# Patient Record
Sex: MALE | Race: WHITE | NOT HISPANIC OR LATINO | URBAN - METROPOLITAN AREA
[De-identification: names, ages, dates, MRNs, and addresses within clinical notes are randomized per-mention and may not be internally consistent; named-entity substitution may affect disease eponyms.]

---

## 2023-03-25 ENCOUNTER — EMERGENCY (EMERGENCY)
Facility: HOSPITAL | Age: 38
LOS: 1 days | Discharge: HOME | End: 2023-03-25
Attending: STUDENT IN AN ORGANIZED HEALTH CARE EDUCATION/TRAINING PROGRAM | Admitting: STUDENT IN AN ORGANIZED HEALTH CARE EDUCATION/TRAINING PROGRAM
Payer: COMMERCIAL

## 2023-03-25 VITALS
HEART RATE: 106 BPM | WEIGHT: 235.01 LBS | SYSTOLIC BLOOD PRESSURE: 119 MMHG | HEIGHT: 73 IN | DIASTOLIC BLOOD PRESSURE: 70 MMHG | OXYGEN SATURATION: 95 % | TEMPERATURE: 103 F | RESPIRATION RATE: 18 BRPM

## 2023-03-25 VITALS
DIASTOLIC BLOOD PRESSURE: 74 MMHG | SYSTOLIC BLOOD PRESSURE: 117 MMHG | HEART RATE: 82 BPM | RESPIRATION RATE: 18 BRPM | OXYGEN SATURATION: 98 % | TEMPERATURE: 99 F

## 2023-03-25 DIAGNOSIS — Z20.822 CONTACT WITH AND (SUSPECTED) EXPOSURE TO COVID-19: ICD-10-CM

## 2023-03-25 DIAGNOSIS — J06.9 ACUTE UPPER RESPIRATORY INFECTION, UNSPECIFIED: ICD-10-CM

## 2023-03-25 DIAGNOSIS — R51.9 HEADACHE, UNSPECIFIED: ICD-10-CM

## 2023-03-25 DIAGNOSIS — B97.89 OTHER VIRAL AGENTS AS THE CAUSE OF DISEASES CLASSIFIED ELSEWHERE: ICD-10-CM

## 2023-03-25 LAB — LACTATE SERPL-SCNC: 1.1 MMOL/L — SIGNIFICANT CHANGE UP (ref 0.5–2)

## 2023-03-25 PROCEDURE — 99284 EMERGENCY DEPT VISIT MOD MDM: CPT | Mod: 25

## 2023-03-25 PROCEDURE — 87040 BLOOD CULTURE FOR BACTERIA: CPT

## 2023-03-25 PROCEDURE — 83605 ASSAY OF LACTIC ACID: CPT

## 2023-03-25 PROCEDURE — 99285 EMERGENCY DEPT VISIT HI MDM: CPT

## 2023-03-25 PROCEDURE — 85025 COMPLETE CBC W/AUTO DIFF WBC: CPT

## 2023-03-25 PROCEDURE — 80053 COMPREHEN METABOLIC PANEL: CPT

## 2023-03-25 PROCEDURE — 71046 X-RAY EXAM CHEST 2 VIEWS: CPT

## 2023-03-25 PROCEDURE — 71046 X-RAY EXAM CHEST 2 VIEWS: CPT | Mod: 26

## 2023-03-25 PROCEDURE — 70450 CT HEAD/BRAIN W/O DYE: CPT | Mod: 26,MA

## 2023-03-25 PROCEDURE — 87635 SARS-COV-2 COVID-19 AMP PRB: CPT

## 2023-03-25 PROCEDURE — 70450 CT HEAD/BRAIN W/O DYE: CPT | Mod: MA

## 2023-03-25 PROCEDURE — 81001 URINALYSIS AUTO W/SCOPE: CPT

## 2023-03-25 PROCEDURE — 36415 COLL VENOUS BLD VENIPUNCTURE: CPT

## 2023-03-25 NOTE — ED ADULT NURSE REASSESSMENT NOTE - NS ED NURSE REASSESS COMMENT FT1
Patient aox3, anxious, c/o of headache, fever, mild SOB, cough and body aches.  NSR on cardiac monitor, elevated temperature 103o, other vitals stable.  Right AC PIV #20 in place, all labs, sepsis work up done, blood cultures sent.  NSS 1 L bolus, Ofirmev 1 gm IVPB adminsitered VORB Dr. Jefferson.  Xray pending.  Isolation observed.  Will continue to monitor.

## 2023-03-25 NOTE — ED PROVIDER NOTE - OBJECTIVE STATEMENT
37-year-old male with no significant past medical history presenting with 3 days of flulike symptoms.  Patient endorsing headache, myalgias, cough, mild shortness of breath.  Patient's PCP prescribed him cefdinir which she began taking.  Denies any chest pain, no lightheadedness or dizziness, no neck pain or neck stiffness.  No blurry vision, no nausea or vomiting, no numbness or weakness or tingling.  No leg swelling.  ROS as above.

## 2023-03-25 NOTE — ED PROVIDER NOTE - NSFOLLOWUPINSTRUCTIONS_ED_ALL_ED_FT
Please take antibiotics as prescribed. Please stay well hydrated. Return for any worsening symptoms, fever, chills, shortness of breath. You should follow up with your primary physician on Tuesday as scheduled.     I hope you feel better soon!    Sincerely,  Julianne Madrigal MD

## 2023-03-25 NOTE — ED PROVIDER NOTE - PROGRESS NOTE DETAILS
Labs unremarkable, CT Head neg, CXR with possible developing infiltrate, discussed with patient's PCP, will see him on Tuesday. Advised him to continue abx prescribed to him by his PCP. Patient otherwise stable for discharge.

## 2023-03-25 NOTE — ED PROVIDER NOTE - CARE PROVIDER_API CALL
Nithin Skinner   INTERNAL MEDICINE  [address] [address]  Phone: [phone]  Fax: [phone]  Follow Up Time: 1-3 Days

## 2023-03-25 NOTE — ED ADULT NURSE NOTE - OBJECTIVE STATEMENT
Patient w/ no significant PMHx, c/o of cough, headache and fever w/ body aches and chills X 4 days, with nausea/no vomitting, with diarrhea X 4, no chest pain, no SOB.  States had Covid booster and Flu vaccine.  Temperature yesterday 103o, last took Tylenol PO 6 am today.

## 2023-03-25 NOTE — ED PROVIDER NOTE - PHYSICAL EXAMINATION
General: Well appearing, in no acute distress  HEENT: Normocephalic, atraumatic, extraocular movements intact  CV: Regular rate  Pulm: No respiratory distress, no tachypnea, CTAB, no w/r/r  Abd: Flat, no gross distension  Ext: warm and well perfused  Skin: No gross rashes or lesions  Neuro: Alert and oriented, moving all extremities, no nuchal rigidity, motor and sensation intact

## 2023-03-25 NOTE — ED ADULT NURSE REASSESSMENT NOTE - NS ED NURSE REASSESS COMMENT FT1
All labs, Xray resulted, NSS 1 L bolus, Ofirmev IVPB completed, patient states feeling better, no headache, fever resolved, fluids PO tolerated well.  Vital signs stable. Discharged to home in stable condition.

## 2023-03-25 NOTE — ED PROVIDER NOTE - PATIENT PORTAL LINK FT
You can access the FollowMyHealth Patient Portal offered by [facility] by registering at the following website: [email or web address]. By joining [employer]’s FollowMyHealth portal, you will also be able to view your health information using other applications (apps) compatible with our system.

## 2023-03-25 NOTE — ED PROVIDER NOTE - NS ED ROS FT
Constitutional: + f/c  Eyes: No discharge or drainage  Ears, Nose, Mouth, Throat: No nasal discharge, no sore throat  Cardiovascular: No chest pain, no palpitations  Respiratory: + shortness of breath, + cough  Gastrointestinal: No nausea or vomiting, no abdominal pain, no diarrhea or constipation  Musculoskeletal: No joint pain, no swelling  Skin: No rashes or lesions  Neurological: No numbness, weakness, tingling, + headache  Psychiatric: No depression

## 2023-03-25 NOTE — ED PROVIDER NOTE - CLINICAL SUMMARY MEDICAL DECISION MAKING FREE TEXT BOX
37-year-old male with no significant past medical history presenting with 2 to 3 days of URI symptoms, fever, cough, shortness of breath, headache.  Patient well-appearing, mild hypoxia 94% on room air but no respiratory distress, lungs clear to auscultation bilaterally.  Exam otherwise nonfocal.  Also with headache but no nuchal rigidity, no concern for meningitis on exam.  Sent by Dr. Skinner for CT head. Will check labs, CXR, COVID/FLU, IV hydration, CT Head, reassess.

## 2023-03-29 ENCOUNTER — INPATIENT (INPATIENT)
Facility: HOSPITAL | Age: 38
LOS: 1 days | Discharge: HOME | DRG: 178 | End: 2023-03-31
Payer: COMMERCIAL

## 2023-03-29 VITALS
WEIGHT: 235.01 LBS | RESPIRATION RATE: 20 BRPM | DIASTOLIC BLOOD PRESSURE: 71 MMHG | SYSTOLIC BLOOD PRESSURE: 128 MMHG | TEMPERATURE: 99 F | HEIGHT: 74 IN | HEART RATE: 104 BPM | OXYGEN SATURATION: 93 %

## 2023-03-29 DIAGNOSIS — Z59.9 PROBLEM RELATED TO HOUSING AND ECONOMIC CIRCUMSTANCES, UNSPECIFIED: ICD-10-CM

## 2023-03-29 DIAGNOSIS — E87.1 HYPO-OSMOLALITY AND HYPONATREMIA: ICD-10-CM

## 2023-03-29 DIAGNOSIS — Z29.9 ENCOUNTER FOR PROPHYLACTIC MEASURES, UNSPECIFIED: ICD-10-CM

## 2023-03-29 DIAGNOSIS — J18.9 PNEUMONIA, UNSPECIFIED ORGANISM: ICD-10-CM

## 2023-03-29 LAB
MAGNESIUM SERPL-MCNC: 2 MG/DL — SIGNIFICANT CHANGE UP (ref 1.6–2.6)
PHOSPHATE SERPL-MCNC: 3.1 MG/DL — SIGNIFICANT CHANGE UP (ref 2.5–4.5)
S PNEUM AG UR QL: NEGATIVE — SIGNIFICANT CHANGE UP

## 2023-03-29 PROCEDURE — 99284 EMERGENCY DEPT VISIT MOD MDM: CPT

## 2023-03-29 PROCEDURE — 99232 SBSQ HOSP IP/OBS MODERATE 35: CPT | Mod: GC

## 2023-03-29 PROCEDURE — 71046 X-RAY EXAM CHEST 2 VIEWS: CPT | Mod: 26

## 2023-03-29 RX ORDER — IPRATROPIUM/ALBUTEROL SULFATE 18-103MCG
3 AEROSOL WITH ADAPTER (GRAM) INHALATION EVERY 6 HOURS
Refills: 0 | Status: DISCONTINUED | OUTPATIENT
Start: 2023-03-29 | End: 2023-03-31

## 2023-03-29 RX ORDER — ACETAMINOPHEN 500 MG
650 TABLET ORAL EVERY 6 HOURS
Refills: 0 | Status: DISCONTINUED | OUTPATIENT
Start: 2023-03-29 | End: 2023-03-31

## 2023-03-29 RX ORDER — AZITHROMYCIN 500 MG/1
500 TABLET, FILM COATED ORAL EVERY 24 HOURS
Refills: 0 | Status: COMPLETED | OUTPATIENT
Start: 2023-03-30 | End: 2023-03-31

## 2023-03-29 RX ORDER — CEFTRIAXONE 500 MG/1
1000 INJECTION, POWDER, FOR SOLUTION INTRAMUSCULAR; INTRAVENOUS EVERY 24 HOURS
Refills: 0 | Status: DISCONTINUED | OUTPATIENT
Start: 2023-03-30 | End: 2023-03-30

## 2023-03-29 RX ORDER — ACETAMINOPHEN 500 MG
1000 TABLET ORAL ONCE
Refills: 0 | Status: COMPLETED | OUTPATIENT
Start: 2023-03-29 | End: 2023-03-29

## 2023-03-29 RX ORDER — LANOLIN ALCOHOL/MO/W.PET/CERES
3 CREAM (GRAM) TOPICAL AT BEDTIME
Refills: 0 | Status: DISCONTINUED | OUTPATIENT
Start: 2023-03-29 | End: 2023-03-31

## 2023-03-29 RX ADMIN — Medication 650 MILLIGRAM(S): at 23:15

## 2023-03-29 RX ADMIN — Medication 400 MILLIGRAM(S): at 18:47

## 2023-03-29 RX ADMIN — Medication 650 MILLIGRAM(S): at 22:21

## 2023-03-29 RX ADMIN — Medication 3 MILLILITER(S): at 17:19

## 2023-03-29 RX ADMIN — Medication 3 MILLIGRAM(S): at 22:21

## 2023-03-29 RX ADMIN — Medication 1000 MILLIGRAM(S): at 21:01

## 2023-03-29 SDOH — ECONOMIC STABILITY - INCOME SECURITY: PROBLEM RELATED TO HOUSING AND ECONOMIC CIRCUMSTANCES, UNSPECIFIED: Z59.9

## 2023-03-29 NOTE — H&P ADULT - NSHPLABSRESULTS_GEN_ALL_CORE
LABS:                        13.6   6.91  )-----------( 293      ( 29 Mar 2023 09:23 )             39.9     03-29    132<L>  |  92<L>  |  13  ----------------------------<  120<H>  3.8   |  29  |  1.25    Ca    9.2      29 Mar 2023 09:23    TPro  7.7  /  Alb  4.0  /  TBili  0.4  /  DBili  x   /  AST  32  /  ALT  36  /  AlkPhos  48  03-29        CAPILLARY BLOOD GLUCOSE                  Urinalysis Basic - ( 29 Mar 2023 10:56 )    Color: Yellow / Appearance: Clear / SG: <=1.005 / pH: x  Gluc: x / Ketone: NEGATIVE  / Bili: Negative / Urobili: 0.2 E.U./dL   Blood: x / Protein: NEGATIVE mg/dL / Nitrite: NEGATIVE   Leuk Esterase: NEGATIVE / RBC: x / WBC x   Sq Epi: x / Non Sq Epi: x / Bacteria: x                I & O Summary:      Microbiology:        RADIOLOGY, EKG AND ADDITIONAL TESTS: Reviewed.

## 2023-03-29 NOTE — H&P ADULT - PROBLEM SELECTOR PLAN 3
F: PO  E: Replete PRN  N: Regular diet   GI ppx: None  DVT ppx: None  Code status: Full code  Dispo: MARLY

## 2023-03-29 NOTE — H&P ADULT - NSHPPHYSICALEXAM_GEN_ALL_CORE
T(C): 37.1 (03-29-23 @ 13:31), Max: 37.1 (03-29-23 @ 13:31)  HR: 85 (03-29-23 @ 13:31) (85 - 85)  BP: 112/63 (03-29-23 @ 13:31) (112/63 - 112/63)  RR: 18 (03-29-23 @ 13:31) (18 - 18)  SpO2: 97% (03-29-23 @ 13:31) (97% - 97%)    GENERAL: Younger male, diaphoretic, uncomfortable-appearing  HEENT: NC/AT, PERRLA, EOMI, conjunctiva and sclera clear, MMM, OP clear   RESP: CTAB, no rales, ronchi, or wheezing  CV: RRR, +S1S2, no murmurs, rubs, or gallops   ABD: Soft NTND, no rebound or guarding, no palpable masses   EXT: WWP, 2+ peripheral pulses, no clubbing, cyanosis, or edema  NEURO: A&Ox3, no focal neurologic deficits   PSYCH: Normal mood and affect

## 2023-03-29 NOTE — H&P ADULT - HISTORY OF PRESENT ILLNESS
Patient is 37 year old with no significant past medical history presenting for fever, cough, sweats, headache for 1 week. Seen at [facility] last week with negative CT head and CXR and normal labs. Was discharged with Augmentin. Saw PCP Dr. Skinner, had repeat CT head and CT chest, Reported pneumonia on CT chest. Was also started on steroids and atrovent inhaler. Wife says patient has been "delirious;" when asked to elaborate, patient says he is occasionally "forgetful." Dr. Skinner and Dr. Beth conversed and recommend the patient go to the hospital for possible admission and further workup. Pt took Tylenol 1g po prior to arrival.    ED course:  Vitals: /71, , RR 20, T 99.4 F, O2 93%  Labs: ESR 84, CBC wnl, lactate 1.0, Na 132, Cl 92  U/A negative  CXR: consolidation right lower lobe  ECG: NSR @99 bpm, right axis deviation, QTc 462, no ST-T changes  Interventions: LR 1L, ceftriaxone 1 g, azithromycin 500 mg, Tylenol 650 mg po x1, ibuprofen 400 mg po x1 KENNY GRAF [id number] is a 36 yo Male with no significant PMH who presents to the ED with fever, cough, sweats, headache, and mild SOB x1 week. Pt developed these symptoms 1 week ago, for which he saw his PCP Dr. Skinner who prescribed him cefdinir which he began taking. He was seen at [facility] ED on 3/25 when these symptoms did not improve on cefdinir. At that time, labs were unremarkable, CT head was negative, and CXR was clear. He was discharged from the ED on Augmentin. He saw his PCP again, where he had a repeat CT head and CT chest performed. CT chest showed RLL pneumonia and pt was also started on PO prednisone and Atrovent inhaler. Wife reports that since pt has been taking prednisone, he has been "delirious". When asked to elaborate, pt says that he has been occasionally "forgetful". Pt also reports that he has been having very high fevers between 103-105 F, which has required Tylenol every 6 hours. Dr. Skinner and Dr. Beth conversed and recommended patient return to the hospital for possible admission and further workup. On arrival, pt continued to report fever, chills, headache, lightheadedness, mild SOB, dry cough, decreased PO intake, diarrhea (2 liquid stools/day), fatigue, and myalgias. He denies chest pain, palpitations, nausea/vomiting, constipation, urinary frequency/urgency, dysuria, vision changes, lower extremity swelling, numbness, or tingling. Also denies recent travel, recent antibiotic use, or sick contacts.    ED vitals: T 99.4 F, , /71, RR 20, O2 93% on RA  ED labs: ESR 84, CBC wnl, lactate 1.0, Na 132, CL 92, UA negative, RVP/Covid negative   ED studies: CXR- Right lower lobe consolidation. EKG- NSR, right axis deviation, QTc 462, no ST-T changes   ED Interventions: 1L LR, CTX 1g IV x1, azithromycin 500 mg IV x1, tylenol 650 mg PO x1 (also took tylenol 1g PO prior to ED arrival), ibuprofen 400 mg PO x1

## 2023-03-29 NOTE — H&P ADULT - PROBLEM SELECTOR PLAN 1
Presented with fever, cough, sweats, headche, and mild SOB x1 week. CXR showing RLL consolidation. 1/4 SIRS criteria on admission. WBC negative. s/p CTX 1g IV x1 and azithromycin 500 mg IV x1 in ED   - c/w CTX 1g IV daily & azithromycin 500 mg IV daily   - c/w Tylenol 650 mg PO q6h PRN for fever   - Duo nebs PRN  - f/u urine strep/legionella   - Hold off on additional steroids given delirium/confusion on PO prednisone Presented with fever, cough, sweats, headche, and mild SOB x1 week. CXR showing RLL consolidation. 1/4 SIRS criteria on admission. WBC negative. s/p CTX 1g IV x1 and azithromycin 500 mg IV x1 in ED   - c/w CTX 1g IV daily & azithromycin 500 mg IV daily   - c/w Tylenol 650 mg PO q6h PRN for fever   - Duo nebs PRN  - f/u urine strep/legionella   - f/u BCx, UCx   - Hold off on additional steroids given delirium/confusion on PO prednisone Presented with fever, cough, sweats, headche, and mild SOB x1 week. CXR showing RLL consolidation. 1/4 SIRS criteria on admission. WBC negative. s/p CTX 1g IV x1 and azithromycin 500 mg IV x1 in ED   - c/w CTX 1g IV daily & azithromycin 500 mg IV daily   - c/w Tylenol 650 mg PO q6h PRN for fever   - Duo nebs PRN  - f/u urine strep/legionella   - f/u BCx, UCx   - Monitor CBC, vitals  - Hold off on additional steroids given delirium/confusion on PO prednisone Presented with fever, cough, sweats, headche, and mild SOB x1 week. CXR showing RLL consolidation. 1/4 SIRS criteria on admission. WBC negative. HIV negative on outpatient labs. s/p CTX 1g IV x1 and azithromycin 500 mg IV x1 in ED   - c/w CTX 1g IV daily & azithromycin 500 mg IV daily   - c/w Tylenol 650 mg PO q6h PRN for fever   - Duo nebs PRN  - f/u urine strep/legionella   - f/u BCx, UCx   - Monitor CBC, vitals  - Hold off on additional steroids given delirium/confusion on PO prednisone

## 2023-03-29 NOTE — H&P ADULT - NSHPSOCIALHISTORY_GEN_ALL_CORE
Lives with wife who works as . Goes to gym often. Denies any significant tobacco, alcohol, or recreational drug use.

## 2023-03-29 NOTE — H&P ADULT - ASSESSMENT
37 M w/ no significant PMH who presents for fever, cough, sweats, headache, and mild SOB x1 week, admitted for further treatment and workup of RLL PNA  37 M w/ no significant PMH who presents for fever, cough, sweats, headache, and mild SOB x1 week, admitted for RLL PNA

## 2023-03-29 NOTE — H&P ADULT - TIME BILLING
Patient seen and examined with house-staff during bedside rounds.  Resident note read, including vitals, physical findings, laboratory data, and radiological reports.   Revisions included below.  Direct personal management at bed side and extensive interpretation of the data.  Plan was outlined and discussed in details with the housestaff.  Decision making of high complexity  Action taken for acute disease activity to reflect the level of care provided:  - medication reconciliation  - review laboratory data  The patient was treated for CAP with Cefdinir but h did not improve and still spiking fever.  He had CT scan and labs which was reviewed.  CXR still consistent of PNA.  No evidence of pleural effusion neither on CXR nor CT.  No recent travel.  No tick bites.  Follow on cultures and serology.  Started IV abt.  Observe. Discussed with family

## 2023-03-30 LAB
ALBUMIN SERPL ELPH-MCNC: 3.3 G/DL — SIGNIFICANT CHANGE UP (ref 3.3–5)
ALP SERPL-CCNC: 44 U/L — SIGNIFICANT CHANGE UP (ref 40–120)
ALT FLD-CCNC: 38 U/L — SIGNIFICANT CHANGE UP (ref 10–45)
ANION GAP SERPL CALC-SCNC: 13 MMOL/L — SIGNIFICANT CHANGE UP (ref 5–17)
AST SERPL-CCNC: 35 U/L — SIGNIFICANT CHANGE UP (ref 10–40)
BASOPHILS # BLD AUTO: 0.03 K/UL — SIGNIFICANT CHANGE UP (ref 0–0.2)
BASOPHILS NFR BLD AUTO: 0.4 % — SIGNIFICANT CHANGE UP (ref 0–2)
BILIRUB SERPL-MCNC: 0.4 MG/DL — SIGNIFICANT CHANGE UP (ref 0.2–1.2)
BUN SERPL-MCNC: 9 MG/DL — SIGNIFICANT CHANGE UP (ref 7–23)
CALCIUM SERPL-MCNC: 8.6 MG/DL — SIGNIFICANT CHANGE UP (ref 8.4–10.5)
CHLORIDE SERPL-SCNC: 91 MMOL/L — LOW (ref 96–108)
CO2 SERPL-SCNC: 27 MMOL/L — SIGNIFICANT CHANGE UP (ref 22–31)
CREAT ?TM UR-MCNC: 80 MG/DL — SIGNIFICANT CHANGE UP
CREAT SERPL-MCNC: 1.13 MG/DL — SIGNIFICANT CHANGE UP (ref 0.5–1.3)
CRP SERPL-MCNC: 260.7 MG/L — HIGH (ref 0–4)
EGFR: 86 ML/MIN/1.73M2 — SIGNIFICANT CHANGE UP
EOSINOPHIL # BLD AUTO: 0 K/UL — SIGNIFICANT CHANGE UP (ref 0–0.5)
EOSINOPHIL NFR BLD AUTO: 0 % — SIGNIFICANT CHANGE UP (ref 0–6)
ERYTHROCYTE [SEDIMENTATION RATE] IN BLOOD: 85 MM/HR — HIGH
FERRITIN SERPL-MCNC: 1764 NG/ML — HIGH (ref 30–400)
GLUCOSE SERPL-MCNC: 116 MG/DL — HIGH (ref 70–99)
HCT VFR BLD CALC: 35.9 % — LOW (ref 39–50)
HGB BLD-MCNC: 12.1 G/DL — LOW (ref 13–17)
IMM GRANULOCYTES NFR BLD AUTO: 0.6 % — SIGNIFICANT CHANGE UP (ref 0–0.9)
LEGIONELLA AG UR QL: NEGATIVE — SIGNIFICANT CHANGE UP
LYMPHOCYTES # BLD AUTO: 1.5 K/UL — SIGNIFICANT CHANGE UP (ref 1–3.3)
LYMPHOCYTES # BLD AUTO: 21.6 % — SIGNIFICANT CHANGE UP (ref 13–44)
MAGNESIUM SERPL-MCNC: 2.2 MG/DL — SIGNIFICANT CHANGE UP (ref 1.6–2.6)
MCHC RBC-ENTMCNC: 29.7 PG — SIGNIFICANT CHANGE UP (ref 27–34)
MCHC RBC-ENTMCNC: 33.7 GM/DL — SIGNIFICANT CHANGE UP (ref 32–36)
MCV RBC AUTO: 88.2 FL — SIGNIFICANT CHANGE UP (ref 80–100)
MONOCYTES # BLD AUTO: 0.57 K/UL — SIGNIFICANT CHANGE UP (ref 0–0.9)
MONOCYTES NFR BLD AUTO: 8.2 % — SIGNIFICANT CHANGE UP (ref 2–14)
NEUTROPHILS # BLD AUTO: 4.79 K/UL — SIGNIFICANT CHANGE UP (ref 1.8–7.4)
NEUTROPHILS NFR BLD AUTO: 69.2 % — SIGNIFICANT CHANGE UP (ref 43–77)
NRBC # BLD: 0 /100 WBCS — SIGNIFICANT CHANGE UP (ref 0–0)
OSMOLALITY UR: 189 MOSM/KG — LOW (ref 300–900)
PHOSPHATE SERPL-MCNC: 3 MG/DL — SIGNIFICANT CHANGE UP (ref 2.5–4.5)
PLATELET # BLD AUTO: 297 K/UL — SIGNIFICANT CHANGE UP (ref 150–400)
POTASSIUM SERPL-MCNC: 3.4 MMOL/L — LOW (ref 3.5–5.3)
POTASSIUM SERPL-SCNC: 3.4 MMOL/L — LOW (ref 3.5–5.3)
PROCALCITONIN SERPL-MCNC: 0.22 NG/ML — HIGH (ref 0.02–0.1)
PROT SERPL-MCNC: 6.5 G/DL — SIGNIFICANT CHANGE UP (ref 6–8.3)
RBC # BLD: 4.07 M/UL — LOW (ref 4.2–5.8)
RBC # FLD: 13.5 % — SIGNIFICANT CHANGE UP (ref 10.3–14.5)
SODIUM SERPL-SCNC: 131 MMOL/L — LOW (ref 135–145)
SODIUM UR-SCNC: <20 MMOL/L — SIGNIFICANT CHANGE UP
TESTOST FREE+TOTAL PANEL SERPL-MCNC: 61.8 NG/DL — LOW (ref 300–836)
UUN UR-MCNC: 293 MG/DL — SIGNIFICANT CHANGE UP
WBC # BLD: 6.93 K/UL — SIGNIFICANT CHANGE UP (ref 3.8–10.5)
WBC # FLD AUTO: 6.93 K/UL — SIGNIFICANT CHANGE UP (ref 3.8–10.5)

## 2023-03-30 PROCEDURE — 99222 1ST HOSP IP/OBS MODERATE 55: CPT

## 2023-03-30 PROCEDURE — 99223 1ST HOSP IP/OBS HIGH 75: CPT | Mod: GC

## 2023-03-30 RX ORDER — SODIUM CHLORIDE 9 MG/ML
3 INJECTION INTRAMUSCULAR; INTRAVENOUS; SUBCUTANEOUS ONCE
Refills: 0 | Status: COMPLETED | OUTPATIENT
Start: 2023-03-30 | End: 2023-03-30

## 2023-03-30 RX ORDER — IPRATROPIUM/ALBUTEROL SULFATE 18-103MCG
3 AEROSOL WITH ADAPTER (GRAM) INHALATION ONCE
Refills: 0 | Status: COMPLETED | OUTPATIENT
Start: 2023-03-30 | End: 2023-03-30

## 2023-03-30 RX ORDER — CEFTRIAXONE 500 MG/1
2000 INJECTION, POWDER, FOR SOLUTION INTRAMUSCULAR; INTRAVENOUS EVERY 24 HOURS
Refills: 0 | Status: DISCONTINUED | OUTPATIENT
Start: 2023-03-30 | End: 2023-03-30

## 2023-03-30 RX ORDER — CEFTRIAXONE 500 MG/1
1000 INJECTION, POWDER, FOR SOLUTION INTRAMUSCULAR; INTRAVENOUS ONCE
Refills: 0 | Status: COMPLETED | OUTPATIENT
Start: 2023-03-30 | End: 2023-03-30

## 2023-03-30 RX ORDER — SODIUM CHLORIDE 9 MG/ML
4 INJECTION INTRAMUSCULAR; INTRAVENOUS; SUBCUTANEOUS EVERY 12 HOURS
Refills: 0 | Status: DISCONTINUED | OUTPATIENT
Start: 2023-03-30 | End: 2023-03-31

## 2023-03-30 RX ORDER — CEFTRIAXONE 500 MG/1
2000 INJECTION, POWDER, FOR SOLUTION INTRAMUSCULAR; INTRAVENOUS EVERY 24 HOURS
Refills: 0 | Status: DISCONTINUED | OUTPATIENT
Start: 2023-03-31 | End: 2023-03-31

## 2023-03-30 RX ADMIN — Medication 3 MILLIGRAM(S): at 21:47

## 2023-03-30 RX ADMIN — Medication 3 MILLILITER(S): at 06:17

## 2023-03-30 RX ADMIN — AZITHROMYCIN 500 MILLIGRAM(S): 500 TABLET, FILM COATED ORAL at 08:56

## 2023-03-30 RX ADMIN — CEFTRIAXONE 100 MILLIGRAM(S): 500 INJECTION, POWDER, FOR SOLUTION INTRAMUSCULAR; INTRAVENOUS at 16:22

## 2023-03-30 RX ADMIN — Medication 650 MILLIGRAM(S): at 12:47

## 2023-03-30 RX ADMIN — SODIUM CHLORIDE 3 MILLILITER(S): 9 INJECTION INTRAMUSCULAR; INTRAVENOUS; SUBCUTANEOUS at 14:37

## 2023-03-30 RX ADMIN — Medication 3 MILLILITER(S): at 14:37

## 2023-03-30 RX ADMIN — Medication 650 MILLIGRAM(S): at 19:27

## 2023-03-30 RX ADMIN — Medication 650 MILLIGRAM(S): at 18:53

## 2023-03-30 RX ADMIN — Medication 650 MILLIGRAM(S): at 12:17

## 2023-03-30 RX ADMIN — Medication 650 MILLIGRAM(S): at 07:00

## 2023-03-30 RX ADMIN — CEFTRIAXONE 100 MILLIGRAM(S): 500 INJECTION, POWDER, FOR SOLUTION INTRAMUSCULAR; INTRAVENOUS at 08:56

## 2023-03-30 RX ADMIN — Medication 30 MILLILITER(S): at 14:51

## 2023-03-30 RX ADMIN — Medication 650 MILLIGRAM(S): at 06:13

## 2023-03-30 NOTE — CONSULT NOTE ADULT - ATTENDING COMMENTS
Agree with above. Patient has community acquired pneumonia.  Most likely etiology is Strep pneumoniae. I am also concerned for legionaire's disease given diarrhea, hyponatremia and non-response to beta-lactams.  Send urine legionella antigen and legionella sputum culture.  Continue Azithromycin 500 mg PO daily.  Can continue Ceftriaxone 2 grams IV daily.  Send sputum culture (induced)

## 2023-03-30 NOTE — PROGRESS NOTE ADULT - PROBLEM SELECTOR PLAN 1
Presented with fever, cough, sweats, headche, and mild SOB x1 week. CXR showing RLL consolidation. 1/4 SIRS criteria on admission. WBC negative. HIV negative on outpatient labs. s/p CTX 1g IV x1 and azithromycin 500 mg IV x1 in ED   - Increase CTX to 2g IV daily   - azithromycin 500 mg IV daily   - c/w Tylenol 650 mg PO q6h PRN for fever   - Duo nebs PRN with hypertonic saline.   - urine strep and legionella negative.   - cultures negative.   - ID recs appreciated  - will induce sputum culture.

## 2023-03-30 NOTE — PROGRESS NOTE ADULT - PROBLEM SELECTOR PLAN 2
Na 132 on admission. Na 137 during previous ED visit on 3/29. Likely hypovolemic hyponatremia i/s/o decreased PO intake   - urine studies suggest hypovolemic hyponatremia.   - Encourage PO intake   - Monitor BMP

## 2023-03-30 NOTE — CONSULT NOTE ADULT - SUBJECTIVE AND OBJECTIVE BOX
INFECTIOUS DISEASES INITIAL CONSULT NOTE    HPI:  KENNY GRAF [id number] is a 38 yo Male with no significant PMH who presents to the ED with fever, cough, sweats, headache, and mild SOB x1 week. Pt developed these symptoms 1 week ago, for which he saw his PCP Dr. Skinner who prescribed him cefdinir which he began taking. He was seen at [facility] ED on 3/25 when these symptoms did not improve on cefdinir. At that time, labs were unremarkable, CT head was negative, and CXR was clear. He was discharged from the ED on Augmentin. He saw his PCP again, where he had a repeat CT head and CT chest performed. CT chest showed RLL pneumonia and pt was also started on PO prednisone and Atrovent inhaler. Wife reports that since pt has been taking prednisone, he has been "delirious". When asked to elaborate, pt says that he has been occasionally "forgetful". Pt also reports that he has been having very high fevers between 103-105 F, which has required Tylenol every 6 hours. Dr. Skinner and Dr. Beth conversed and recommended patient return to the hospital for possible admission and further workup. On arrival, pt continued to report fever, chills, headache, lightheadedness, mild SOB, dry cough, decreased PO intake, diarrhea (2 liquid stools/day), fatigue, and myalgias. He denies chest pain, palpitations, nausea/vomiting, constipation, urinary frequency/urgency, dysuria, vision changes, lower extremity swelling, numbness, or tingling. Also denies recent travel, recent antibiotic use, or sick contacts.    ED vitals: T 99.4 F, , /71, RR 20, O2 93% on RA  ED labs: ESR 84, CBC wnl, lactate 1.0, Na 132, CL 92, UA negative, RVP/Covid negative   ED studies: CXR- Right lower lobe consolidation. EKG- NSR, right axis deviation, QTc 462, no ST-T changes   ED Interventions: 1L LR, CTX 1g IV x1, azithromycin 500 mg IV x1, tylenol 650 mg PO x1 (also took tylenol 1g PO prior to ED arrival), ibuprofen 400 mg PO x1 (29 Mar 2023 13:31)    Patient reports ongoing diarrhea x1 week. Also notes a previous hx of COVID x3, most recently >18 months ago. Patient denies recent sick contacts, travel within last 3 months, animal exposure ((other than dachshund - family pet).       PAST MEDICAL & SURGICAL HISTORY:  No pertinent past medical history      No significant past surgical history            Review of Systems:   Constitutional, eyes, ENT, cardiovascular, respiratory, gastrointestinal, genitourinary, integumentary, neurological, psychiatric and heme/lymph are otherwise negative other than noted above       ANTIBIOTICS:  MEDICATIONS  (STANDING):  albuterol/ipratropium for Nebulization 3 milliLiter(s) Nebulizer once  azithromycin   Tablet 500 milliGRAM(s) Oral every 24 hours  cefTRIAXone   IVPB 1000 milliGRAM(s) IV Intermittent every 24 hours  sodium chloride 0.9% for Nebulization 3 milliLiter(s) Nebulizer once    MEDICATIONS  (PRN):  acetaminophen     Tablet .. 650 milliGRAM(s) Oral every 6 hours PRN Temp greater or equal to 38C (100.4F), Mild Pain (1 - 3)  albuterol/ipratropium for Nebulization 3 milliLiter(s) Nebulizer every 6 hours PRN Shortness of Breath and/or Wheezing  bismuth subsalicylate Liquid 30 milliLiter(s) Oral every 4 hours PRN Dyspepsia  melatonin 3 milliGRAM(s) Oral at bedtime PRN Sleep      Allergies    No Known Allergies    Intolerances        SOCIAL HISTORY:    FAMILY HISTORY:   no FH leading to current infection    Vital Signs Last 24 Hrs  T(C): 37.3 (30 Mar 2023 11:27), Max: 39.1 (29 Mar 2023 18:33)  T(F): 99.2 (30 Mar 2023 11:27), Max: 102.4 (29 Mar 2023 18:33)  HR: 90 (30 Mar 2023 11:27) (87 - 98)  BP: 122/72 (30 Mar 2023 11:27) (122/72 - 146/75)  BP(mean): --  RR: 18 (30 Mar 2023 11:27) (16 - 19)  SpO2: 96% (30 Mar 2023 11:27) (93% - 97%)    Parameters below as of 30 Mar 2023 11:27  Patient On (Oxygen Delivery Method): room air          PHYSICAL EXAM:  Constitutional: alert, NAD  Eyes: the sclera and conjunctiva were normal.   ENT: the ears and nose were normal in appearance.   Neck: the appearance of the neck was normal and the neck was supple.   Pulmonary: no respiratory distress and faintly appreciable crackles in LLL (paraspinal area).   Heart: heart rate was normal and rhythm regular, normal S1 and S2  Vascular:. there was no peripheral edema  Abdomen: normal bowel sounds, soft, non-tender  Neurological: no focal deficits.   Psychiatric: the affect was normal      LABS:                        12.1   6.93  )-----------( 297      ( 30 Mar 2023 05:30 )             35.9     03-30    131<L>  |  91<L>  |  9   ----------------------------<  116<H>  3.4<L>   |  27  |  1.13    Ca    8.6      30 Mar 2023 05:30  Phos  3.0     03-30  Mg     2.2     03-30    TPro  6.5  /  Alb  3.3  /  TBili  0.4  /  DBili  x   /  AST  35  /  ALT  38  /  AlkPhos  44  03-30      Urinalysis Basic - ( 29 Mar 2023 10:56 )    Color: Yellow / Appearance: Clear / SG: <=1.005 / pH: x  Gluc: x / Ketone: NEGATIVE  / Bili: Negative / Urobili: 0.2 E.U./dL   Blood: x / Protein: NEGATIVE mg/dL / Nitrite: NEGATIVE   Leuk Esterase: NEGATIVE / RBC: x / WBC x   Sq Epi: x / Non Sq Epi: x / Bacteria: x        MICROBIOLOGY:    RADIOLOGY & ADDITIONAL STUDIES:

## 2023-03-31 VITALS
DIASTOLIC BLOOD PRESSURE: 79 MMHG | SYSTOLIC BLOOD PRESSURE: 123 MMHG | OXYGEN SATURATION: 94 % | HEART RATE: 77 BPM | RESPIRATION RATE: 16 BRPM | TEMPERATURE: 98 F

## 2023-03-31 LAB
ALBUMIN SERPL ELPH-MCNC: 3.5 G/DL — SIGNIFICANT CHANGE UP (ref 3.3–5)
ALP SERPL-CCNC: 50 U/L — SIGNIFICANT CHANGE UP (ref 40–120)
ALT FLD-CCNC: 65 U/L — HIGH (ref 10–45)
ANION GAP SERPL CALC-SCNC: 9 MMOL/L — SIGNIFICANT CHANGE UP (ref 5–17)
AST SERPL-CCNC: 54 U/L — HIGH (ref 10–40)
BASOPHILS # BLD AUTO: 0.04 K/UL — SIGNIFICANT CHANGE UP (ref 0–0.2)
BASOPHILS NFR BLD AUTO: 0.7 % — SIGNIFICANT CHANGE UP (ref 0–2)
BILIRUB SERPL-MCNC: 0.3 MG/DL — SIGNIFICANT CHANGE UP (ref 0.2–1.2)
BUN SERPL-MCNC: 8 MG/DL — SIGNIFICANT CHANGE UP (ref 7–23)
CALCIUM SERPL-MCNC: 9.4 MG/DL — SIGNIFICANT CHANGE UP (ref 8.4–10.5)
CHLORIDE SERPL-SCNC: 92 MMOL/L — LOW (ref 96–108)
CO2 SERPL-SCNC: 31 MMOL/L — SIGNIFICANT CHANGE UP (ref 22–31)
CREAT SERPL-MCNC: 1.05 MG/DL — SIGNIFICANT CHANGE UP (ref 0.5–1.3)
EGFR: 94 ML/MIN/1.73M2 — SIGNIFICANT CHANGE UP
EOSINOPHIL # BLD AUTO: 0.12 K/UL — SIGNIFICANT CHANGE UP (ref 0–0.5)
EOSINOPHIL NFR BLD AUTO: 2 % — SIGNIFICANT CHANGE UP (ref 0–6)
GLUCOSE SERPL-MCNC: 118 MG/DL — HIGH (ref 70–99)
HCT VFR BLD CALC: 39.1 % — SIGNIFICANT CHANGE UP (ref 39–50)
HGB BLD-MCNC: 13 G/DL — SIGNIFICANT CHANGE UP (ref 13–17)
IMM GRANULOCYTES NFR BLD AUTO: 0.7 % — SIGNIFICANT CHANGE UP (ref 0–0.9)
LYMPHOCYTES # BLD AUTO: 1.92 K/UL — SIGNIFICANT CHANGE UP (ref 1–3.3)
LYMPHOCYTES # BLD AUTO: 31.3 % — SIGNIFICANT CHANGE UP (ref 13–44)
MAGNESIUM SERPL-MCNC: 2.8 MG/DL — HIGH (ref 1.6–2.6)
MCHC RBC-ENTMCNC: 30 PG — SIGNIFICANT CHANGE UP (ref 27–34)
MCHC RBC-ENTMCNC: 33.2 GM/DL — SIGNIFICANT CHANGE UP (ref 32–36)
MCV RBC AUTO: 90.3 FL — SIGNIFICANT CHANGE UP (ref 80–100)
MONOCYTES # BLD AUTO: 0.69 K/UL — SIGNIFICANT CHANGE UP (ref 0–0.9)
MONOCYTES NFR BLD AUTO: 11.2 % — SIGNIFICANT CHANGE UP (ref 2–14)
NEUTROPHILS # BLD AUTO: 3.33 K/UL — SIGNIFICANT CHANGE UP (ref 1.8–7.4)
NEUTROPHILS NFR BLD AUTO: 54.1 % — SIGNIFICANT CHANGE UP (ref 43–77)
NRBC # BLD: 0 /100 WBCS — SIGNIFICANT CHANGE UP (ref 0–0)
PHOSPHATE SERPL-MCNC: 4.3 MG/DL — SIGNIFICANT CHANGE UP (ref 2.5–4.5)
PLATELET # BLD AUTO: 295 K/UL — SIGNIFICANT CHANGE UP (ref 150–400)
POTASSIUM SERPL-MCNC: 3.9 MMOL/L — SIGNIFICANT CHANGE UP (ref 3.5–5.3)
POTASSIUM SERPL-SCNC: 3.9 MMOL/L — SIGNIFICANT CHANGE UP (ref 3.5–5.3)
PROT SERPL-MCNC: 7 G/DL — SIGNIFICANT CHANGE UP (ref 6–8.3)
RBC # BLD: 4.33 M/UL — SIGNIFICANT CHANGE UP (ref 4.2–5.8)
RBC # FLD: 13.4 % — SIGNIFICANT CHANGE UP (ref 10.3–14.5)
SODIUM SERPL-SCNC: 132 MMOL/L — LOW (ref 135–145)
WBC # BLD: 6.14 K/UL — SIGNIFICANT CHANGE UP (ref 3.8–10.5)
WBC # FLD AUTO: 6.14 K/UL — SIGNIFICANT CHANGE UP (ref 3.8–10.5)

## 2023-03-31 PROCEDURE — 87086 URINE CULTURE/COLONY COUNT: CPT

## 2023-03-31 PROCEDURE — 82728 ASSAY OF FERRITIN: CPT

## 2023-03-31 PROCEDURE — 83935 ASSAY OF URINE OSMOLALITY: CPT

## 2023-03-31 PROCEDURE — 84540 ASSAY OF URINE/UREA-N: CPT

## 2023-03-31 PROCEDURE — 85027 COMPLETE CBC AUTOMATED: CPT

## 2023-03-31 PROCEDURE — 83735 ASSAY OF MAGNESIUM: CPT

## 2023-03-31 PROCEDURE — 81003 URINALYSIS AUTO W/O SCOPE: CPT

## 2023-03-31 PROCEDURE — 80053 COMPREHEN METABOLIC PANEL: CPT

## 2023-03-31 PROCEDURE — 36415 COLL VENOUS BLD VENIPUNCTURE: CPT

## 2023-03-31 PROCEDURE — 84403 ASSAY OF TOTAL TESTOSTERONE: CPT

## 2023-03-31 PROCEDURE — 87451 POLYVALENT MULT ORG EA AG IA: CPT

## 2023-03-31 PROCEDURE — 82570 ASSAY OF URINE CREATININE: CPT

## 2023-03-31 PROCEDURE — 96374 THER/PROPH/DIAG INJ IV PUSH: CPT

## 2023-03-31 PROCEDURE — 99285 EMERGENCY DEPT VISIT HI MDM: CPT | Mod: 25

## 2023-03-31 PROCEDURE — 83605 ASSAY OF LACTIC ACID: CPT

## 2023-03-31 PROCEDURE — 85025 COMPLETE CBC W/AUTO DIFF WBC: CPT

## 2023-03-31 PROCEDURE — 84300 ASSAY OF URINE SODIUM: CPT

## 2023-03-31 PROCEDURE — 87449 NOS EACH ORGANISM AG IA: CPT

## 2023-03-31 PROCEDURE — 87040 BLOOD CULTURE FOR BACTERIA: CPT

## 2023-03-31 PROCEDURE — 85652 RBC SED RATE AUTOMATED: CPT

## 2023-03-31 PROCEDURE — 84100 ASSAY OF PHOSPHORUS: CPT

## 2023-03-31 PROCEDURE — 96375 TX/PRO/DX INJ NEW DRUG ADDON: CPT

## 2023-03-31 PROCEDURE — 99239 HOSP IP/OBS DSCHRG MGMT >30: CPT | Mod: GC

## 2023-03-31 PROCEDURE — 86140 C-REACTIVE PROTEIN: CPT

## 2023-03-31 PROCEDURE — 71046 X-RAY EXAM CHEST 2 VIEWS: CPT

## 2023-03-31 PROCEDURE — 84145 PROCALCITONIN (PCT): CPT

## 2023-03-31 PROCEDURE — 99232 SBSQ HOSP IP/OBS MODERATE 35: CPT

## 2023-03-31 PROCEDURE — 87899 AGENT NOS ASSAY W/OPTIC: CPT

## 2023-03-31 PROCEDURE — 94640 AIRWAY INHALATION TREATMENT: CPT

## 2023-03-31 PROCEDURE — 0225U NFCT DS DNA&RNA 21 SARSCOV2: CPT

## 2023-03-31 RX ORDER — AZITHROMYCIN 500 MG/1
500 TABLET, FILM COATED ORAL EVERY 24 HOURS
Refills: 0 | Status: DISCONTINUED | OUTPATIENT
Start: 2023-04-01 | End: 2023-03-31

## 2023-03-31 RX ORDER — AZITHROMYCIN 500 MG/1
1 TABLET, FILM COATED ORAL
Qty: 4 | Refills: 0
Start: 2023-03-31 | End: 2023-04-03

## 2023-03-31 RX ORDER — CEFPODOXIME PROXETIL 100 MG
1 TABLET ORAL
Qty: 10 | Refills: 0
Start: 2023-03-31 | End: 2023-04-04

## 2023-03-31 RX ADMIN — Medication 650 MILLIGRAM(S): at 03:19

## 2023-03-31 RX ADMIN — Medication 650 MILLIGRAM(S): at 04:00

## 2023-03-31 RX ADMIN — Medication 650 MILLIGRAM(S): at 13:05

## 2023-03-31 RX ADMIN — AZITHROMYCIN 500 MILLIGRAM(S): 500 TABLET, FILM COATED ORAL at 09:30

## 2023-03-31 RX ADMIN — Medication 650 MILLIGRAM(S): at 12:35

## 2023-03-31 RX ADMIN — Medication 100 MILLIGRAM(S): at 12:35

## 2023-03-31 RX ADMIN — CEFTRIAXONE 100 MILLIGRAM(S): 500 INJECTION, POWDER, FOR SOLUTION INTRAMUSCULAR; INTRAVENOUS at 13:36

## 2023-03-31 RX ADMIN — Medication 3 MILLILITER(S): at 09:42

## 2023-03-31 NOTE — DISCHARGE NOTE PROVIDER - NSDCCAREPROVSEEN_GEN_ALL_CORE_FT
Cristy Beth Cristy Beth A  Patient seen and examined with house-staff during bedside rounds.  Resident note read, including vitals, physical findings, laboratory data, and radiological reports.   Revisions included below.  Direct personal management at bed side and extensive interpretation of the data.  Plan was outlined and discussed in details with the housestaff.  Decision making of high complexity  Action taken for acute disease activity to reflect the level of care provided:  - medication reconciliation  - review laboratory data  The patient clinically improving.  The temperature curve improved.  The Legionella PCR is still pending.  The sodium improved.  Patient indicated that he has diarrhea.  The patient improved and will be discharged home.  Follow ID instruction.  Discussed the case with the family and also his primary MD who will follow-up as an outpatient next week

## 2023-03-31 NOTE — DISCHARGE NOTE PROVIDER - NSDCCPCAREPLAN_GEN_ALL_CORE_FT
PRINCIPAL DISCHARGE DIAGNOSIS  Diagnosis: Legionella pneumonia  Assessment and Plan of Treatment: Legionnaires' disease is a form of pneumonia — lung inflammation usually caused by infection. It's caused by a bacterium known as legionella.  Most people catch Legionnaires' disease by inhaling the bacteria from water or soil. Older adults, smokers and people with weakened immune systems are particularly susceptible to Legionnaires' disease.  Although prompt treatment with antibiotics usually cures Legionnaires' disease, some people continue to have problems after treatment.  Although your urine was negative for legionella pneumonia, your clinical symptoms and labs suggest legionella pneumonia. You are being discharged with oral antibiotics. You should follow-up with your primary care doctor in 1-2 weeks after discharge.

## 2023-03-31 NOTE — PROGRESS NOTE ADULT - TIME BILLING
Patient seen and examined with house-staff during bedside rounds.  Resident note read, including vitals, physical findings, laboratory data, and radiological reports.   Revisions included below.  Direct personal management at bed side and extensive interpretation of the data.  Plan was outlined and discussed in details with the housestaff.  Decision making of high complexity  Action taken for acute disease activity to reflect the level of care provided:  - medication reconciliation  - review laboratory data  Proving.  The temperature curve is down.  All cultures serology negative today.  Follow-up on the Legionella.  ID consult.  The oxygen saturation is stable.  Fluid restriction.
treatment of CAP

## 2023-03-31 NOTE — DISCHARGE NOTE NURSING/CASE MANAGEMENT/SOCIAL WORK - PATIENT PORTAL LINK FT
You can access the FollowMyHealth Patient Portal offered by [facility] by registering at the following website: [email or web address]. By joining [employer]’s FollowMyHealth portal, you will also be able to view your health information using other applications (apps) compatible with our system.

## 2023-03-31 NOTE — DISCHARGE NOTE NURSING/CASE MANAGEMENT/SOCIAL WORK - NSDCPEFALRISK_GEN_ALL_CORE
For information on Fall & Injury Prevention, visit: [email or web address] OR  [email or web address] OR  https://www.cdc.gov/steadi/patient.html

## 2023-03-31 NOTE — DISCHARGE NOTE PROVIDER - CARE PROVIDER_API CALL
Nithin Skinner   INTERNAL MEDICINE  [address] [address]  Phone: [phone]  Fax: [phone]  Follow Up Time:

## 2023-03-31 NOTE — PROGRESS NOTE ADULT - SUBJECTIVE AND OBJECTIVE BOX
INFECTIOUS DISEASES CONSULT FOLLOW-UP NOTE    INTERVAL HPI/OVERNIGHT EVENTS: shon      ROS:   Constitutional, eyes, ENT, cardiovascular, respiratory, gastrointestinal, genitourinary, integumentary, neurological, psychiatric and heme/lymph are otherwise negative other than noted above       ANTIBIOTICS/RELEVANT:    MEDICATIONS  (STANDING):  cefTRIAXone   IVPB 2000 milliGRAM(s) IV Intermittent every 24 hours  sodium chloride 3%  Inhalation 4 milliLiter(s) Inhalation every 12 hours    MEDICATIONS  (PRN):  acetaminophen     Tablet .. 650 milliGRAM(s) Oral every 6 hours PRN Temp greater or equal to 38C (100.4F), Mild Pain (1 - 3)  albuterol/ipratropium for Nebulization 3 milliLiter(s) Nebulizer every 6 hours PRN Shortness of Breath and/or Wheezing  bismuth subsalicylate Liquid 30 milliLiter(s) Oral every 4 hours PRN Dyspepsia  melatonin 3 milliGRAM(s) Oral at bedtime PRN Sleep        Vital Signs Last 24 Hrs  T(C): 36.7 (31 Mar 2023 06:28), Max: 37.2 (30 Mar 2023 20:37)  T(F): 98.1 (31 Mar 2023 06:28), Max: 99 (30 Mar 2023 20:37)  HR: 62 (31 Mar 2023 06:28) (62 - 82)  BP: 110/79 (31 Mar 2023 06:28) (110/79 - 117/78)  BP(mean): --  RR: 18 (31 Mar 2023 06:28) (18 - 18)  SpO2: 96% (31 Mar 2023 06:28) (95% - 96%)    Parameters below as of 31 Mar 2023 06:28  Patient On (Oxygen Delivery Method): room air        PHYSICAL EXAM:  Constitutional: alert, NAD  Eyes: the sclera and conjunctiva were normal.   ENT: the ears and nose were normal in appearance.   Neck: the appearance of the neck was normal and the neck was supple.   Pulmonary: no respiratory distress and faintly appreciable crackles in LLL (paraspinal area).   Heart: heart rate was normal and rhythm regular, normal S1 and S2  Vascular:. there was no peripheral edema  Abdomen: normal bowel sounds, soft, non-tender  Neurological: no focal deficits.   Psychiatric: the affect was normal        LABS:                        13.0   6.14  )-----------( 295      ( 31 Mar 2023 06:26 )             39.1     03-31    132<L>  |  92<L>  |  8   ----------------------------<  118<H>  3.9   |  31  |  1.05    Ca    9.4      31 Mar 2023 06:26  Phos  4.3     03-31  Mg     2.8     03-31    TPro  7.0  /  Alb  3.5  /  TBili  0.3  /  DBili  x   /  AST  54<H>  /  ALT  65<H>  /  AlkPhos  50  03-31          MICROBIOLOGY:      RADIOLOGY & ADDITIONAL STUDIES:  Reviewed
KENNY GRAF 37y Male  MRN#: [id number]   CODE STATUS: FULL      SUBJECTIVE  Patient is a 37y old Male who presents with a chief complaint of Pneumonia (30 Mar 2023 14:29)  Currently admitted to medicine with the primary diagnosis of pneumonia    Today is hospital day 1d, and this morning he is resting in bed comfortably and has no overnight events.     Present Today:           Sahu Catheter (X)No/ ()Yes? Indication:          Central Line (X)No/ ()Yes? Indication:          IV Fluids (X)No/ ()Yes? Type:  Rate:  Indication:      OBJECTIVE  PAST MEDICAL & SURGICAL HISTORY  No pertinent past medical history    No significant past surgical history      Home Meds:    ALLERGIES:  No Known Allergies    MEDICATIONS:  STANDING MEDICATIONS  azithromycin   Tablet 500 milliGRAM(s) Oral every 24 hours  sodium chloride 3%  Inhalation 4 milliLiter(s) Inhalation every 12 hours    PRN MEDICATIONS  acetaminophen     Tablet .. 650 milliGRAM(s) Oral every 6 hours PRN  albuterol/ipratropium for Nebulization 3 milliLiter(s) Nebulizer every 6 hours PRN  bismuth subsalicylate Liquid 30 milliLiter(s) Oral every 4 hours PRN  melatonin 3 milliGRAM(s) Oral at bedtime PRN      VITAL SIGNS: Last 24 Hours  T(C): 37.3 (30 Mar 2023 11:27), Max: 39.1 (29 Mar 2023 18:33)  T(F): 99.2 (30 Mar 2023 11:27), Max: 102.4 (29 Mar 2023 18:33)  HR: 90 (30 Mar 2023 11:27) (87 - 98)  BP: 122/72 (30 Mar 2023 11:27) (122/72 - 146/75)  BP(mean): --  RR: 18 (30 Mar 2023 11:27) (16 - 19)  SpO2: 96% (30 Mar 2023 11:27) (93% - 97%)    LABS:                        12.1   6.93  )-----------( 297      ( 30 Mar 2023 05:30 )             35.9     03-30    131<L>  |  91<L>  |  9   ----------------------------<  116<H>  3.4<L>   |  27  |  1.13    Ca    8.6      30 Mar 2023 05:30  Phos  3.0     03-30  Mg     2.2     03-30    TPro  6.5  /  Alb  3.3  /  TBili  0.4  /  DBili  x   /  AST  35  /  ALT  38  /  AlkPhos  44  03-30      Urinalysis Basic - ( 29 Mar 2023 10:56 )    Color: Yellow / Appearance: Clear / SG: <=1.005 / pH: x  Gluc: x / Ketone: NEGATIVE  / Bili: Negative / Urobili: 0.2 E.U./dL   Blood: x / Protein: NEGATIVE mg/dL / Nitrite: NEGATIVE   Leuk Esterase: NEGATIVE / RBC: x / WBC x   Sq Epi: x / Non Sq Epi: x / Bacteria: x        Sedimentation Rate, Erythrocyte: 85 mm/Hr *H* (03-30-23 @ 05:30)      Culture - Urine (collected 29 Mar 2023 10:56)  Source: .Urine None  Final Report (30 Mar 2023 08:50):    No growth    Culture - Blood (collected 29 Mar 2023 09:25)  Source: .Blood None  Preliminary Report (30 Mar 2023 11:00):    No growth at 1 day.    Culture - Blood (collected 29 Mar 2023 09:23)  Source: .Blood None  Preliminary Report (30 Mar 2023 11:00):    No growth at 1 day.          RADIOLOGY:      PHYSICAL EXAM:  General: NAD, AAOx3  HEENT: atraumatic, normocephalic  Pulmonary: clear to auscultation bilaterally; No wheeze  Cardiovascular: Regular rate and rhythm; no murmurs, rubs or gallops. Normal S1S2 decreased breath sounds in R. lower lung. no appreciable crackles  Gastrointestinal: Soft, nontender, nondistended; bowel sounds present  Musculoskeletal: 2+ peripheral pulses, no clubbing, cyanosis or edema  Neurology: Pt. alert and oriented, fluent speech, able to move all extremities  Skin: no rashes or lesions

## 2023-03-31 NOTE — PROGRESS NOTE ADULT - ATTENDING COMMENTS
Agree with above. He's clearly improved on current therapy. Urine legionella antigen is negative.  However, this only has a sensitivity of 70-80 % and only evaluates the most common serotype.  Index of suspicion is high given diarrhea, hyponatremia, elevated CRP and nonresponse to betalactams.  Recommend 7 day total course of azithromycin (he received 3rd dose today).  Strep is still a possibility as urine antigen is around 70 % sensitive.  Recommend completing a 7 day course of 3rd generation cephalosporins (ends 4/5/23).  Ok to discharge from my standpoint.

## 2023-03-31 NOTE — PROGRESS NOTE ADULT - ASSESSMENT
37M with no PMH who presents to the ED with fever, cough, sweats, headache, and mild SOB x1 week. Reports developing symptoms the week of 3/19, was evaluated by pMD ( Dr. Skinner) and began treatment for CAP with cefdinir. After 1 week of continuing worsening of symptoms patient presented to [facility], witched to augmentin. Upon f/u with pMD one more patient received Atrovent & prednisone (to tx severe PNA?), again without resolution of symptoms. On presentation to [facility] patient found with RLL consolidation on CXR, elevated CRP, negative strep Ag. Given relative health, age, and severity of symptoms with GI complaints (diarrhea), and hyponatremia, highly suspect legionella pneumonia. Although strep Ag negative, test sensitivity insufficient to r/o strep pneumoniae. HIV test as o/p negative. Please do not set end dates for ABx at this time given the likelihood for prolonged ABx treatment (expecting 7-10d treatment course with azithromycin). Legionella Uag negative however only 80% sensitivity and only tests the three most common serotypes. Given high clinical suspicion (CRP >100, GI symptoms, hyponatremia) will complete tx for legionella.   - stop CTX 2g IV q24h   - start cefpodoxime 200mg PO q12h through 4/5 (to complete a 7d course)  - stop azithromycin 500mg IV q24h   - start azithromycin 500mg PO q24h through 4/4 (to complete a 7d course i/s/o legionella suspicion)   - patient should follow with pMD for f/u legionella sputum Cx (requires growth on specialized agar), sputum legionella PCR, & sputum Cx    ID team 1 to sign off
37 M w/ no significant PMH who presents for fever, cough, sweats, headache, and mild SOB x1 week, admitted for RLL PNA

## 2023-03-31 NOTE — DISCHARGE NOTE PROVIDER - HOSPITAL COURSE
37 M w/ no significant PMH who presents for fever, cough, sweats, headache, and mild SOB x1 week, admitted for RLL PNA likely legionella.       Problem/Plan - 1: Community acquired pneumonia.   Presented with fever, cough, sweats, headache, and mild SOB x1 week. CXR showing RLL consolidation. 1/4 SIRS criteria on admission. WBC negative. HIV negative on outpatient labs. s/p CTX 1g IV x1 and azithromycin 500 mg IV x1 in ED   - suspect legionella will f/u legionella sputum culture as outpatient.   - azithromycin 500mg daily 4/4  - Cefpodoxime 200mg q12 hours until 4/5    Problem/Plan - 2: Hyponatremia.   Na 132 on admission. Na 137 during previous ED visit on 3/29. Likely hypovolemic hyponatremia i/s/o decreased PO intake   - may be component of legionella pna        Patient was discharged to: Home  New medications: Azithromycin and cefpodoxime.   Changes to old medications: None  Medications that were stopped: None  Items to Follow up as outpatient: PNA symptoms.   Physical exam at time of discharge:     General: NAD, AAOx3  HEENT: atraumatic, normocephalic  Pulmonary: clear to auscultation bilaterally; No wheeze  Cardiovascular: Regular rate and rhythm; no murmurs, rubs or gallops. Normal S1S2 decreased breath sounds in R. lower lung. no appreciable crackles  Gastrointestinal: Soft, nontender, nondistended; bowel sounds present  Musculoskeletal: 2+ peripheral pulses, no clubbing, cyanosis or edema  Neurology: Pt. alert and oriented, fluent speech, able to move all extremities  Skin: no rashes or lesions

## 2023-03-31 NOTE — DISCHARGE NOTE PROVIDER - NSDCMRMEDTOKEN_GEN_ALL_CORE_FT
azithromycin 500 mg oral tablet: 1 tab(s) orally every 24 hours Last dose on 4/4  cefpodoxime 200 mg oral tablet: 1 tab(s) orally 2 times a day last dose on the evening of 4/5.   azithromycin 500 mg oral tablet: 1 tab(s) orally every 24 hours Last dose on 4/4  benzonatate 100 mg oral capsule: 1 cap(s) orally 3 times a day as needed for  cough  cefpodoxime 200 mg oral tablet: 1 tab(s) orally 2 times a day last dose on the evening of 4/5.

## 2023-03-31 NOTE — SBIRT NOTE ADULT - NSSBIRTUNABLESCROTHER_GEN_A_CORE
SBIRT consult not appropriate at this time; as per chart, patient denies any significant tobacco, alcohol, or recreational drug use.

## 2023-04-05 DIAGNOSIS — J13 PNEUMONIA DUE TO STREPTOCOCCUS PNEUMONIAE: ICD-10-CM

## 2023-04-05 DIAGNOSIS — A48.1 LEGIONNAIRES' DISEASE: ICD-10-CM

## 2023-04-05 DIAGNOSIS — J18.9 PNEUMONIA, UNSPECIFIED ORGANISM: ICD-10-CM

## 2023-04-05 DIAGNOSIS — Z20.822 CONTACT WITH AND (SUSPECTED) EXPOSURE TO COVID-19: ICD-10-CM

## 2023-04-05 DIAGNOSIS — E87.1 HYPO-OSMOLALITY AND HYPONATREMIA: ICD-10-CM

## 2023-04-05 DIAGNOSIS — E86.1 HYPOVOLEMIA: ICD-10-CM

## 2023-10-16 NOTE — H&P ADULT - PROBLEM SELECTOR PLAN 2
PAST MEDICAL HISTORY:  No pertinent past medical history
Na 132 on admission. Na 137 during previous ED visit on 3/29. Likely hypovolemic hyponatremia i/s/o decreased PO intake   - Encourage PO intake   - Monitor BMP